# Patient Record
Sex: MALE | URBAN - METROPOLITAN AREA
[De-identification: names, ages, dates, MRNs, and addresses within clinical notes are randomized per-mention and may not be internally consistent; named-entity substitution may affect disease eponyms.]

---

## 2020-09-03 ENCOUNTER — IMPORTED ENCOUNTER (OUTPATIENT)
Dept: URBAN - METROPOLITAN AREA CLINIC 9 | Facility: CLINIC | Age: 65
End: 2020-09-03

## 2020-09-21 ENCOUNTER — IMPORTED ENCOUNTER (OUTPATIENT)
Dept: URBAN - METROPOLITAN AREA CLINIC 9 | Facility: CLINIC | Age: 65
End: 2020-09-21

## 2020-10-02 ENCOUNTER — IMPORTED ENCOUNTER (OUTPATIENT)
Dept: URBAN - METROPOLITAN AREA CLINIC 9 | Facility: CLINIC | Age: 65
End: 2020-10-02

## 2020-10-15 ENCOUNTER — IMPORTED ENCOUNTER (OUTPATIENT)
Dept: URBAN - METROPOLITAN AREA CLINIC 9 | Facility: CLINIC | Age: 65
End: 2020-10-15

## 2020-10-19 ENCOUNTER — IMPORTED ENCOUNTER (OUTPATIENT)
Dept: URBAN - METROPOLITAN AREA CLINIC 9 | Facility: CLINIC | Age: 65
End: 2020-10-19

## 2020-11-05 ENCOUNTER — IMPORTED ENCOUNTER (OUTPATIENT)
Dept: URBAN - METROPOLITAN AREA CLINIC 9 | Facility: CLINIC | Age: 65
End: 2020-11-05

## 2020-12-08 ENCOUNTER — IMPORTED ENCOUNTER (OUTPATIENT)
Dept: URBAN - METROPOLITAN AREA CLINIC 9 | Facility: CLINIC | Age: 65
End: 2020-12-08

## 2021-02-04 NOTE — PATIENT DISCUSSION
[phone]&nbsp;SN &nbsp; &nbsp; trm

## 2021-10-16 ASSESSMENT — KERATOMETRY
OD_AXISANGLE_DEGREES: 6
OD_K1POWER_DIOPTERS: 42
OD_AXISANGLE2_DEGREES: 91
OS_AXISANGLE_DEGREES: 178
OS_K2POWER_DIOPTERS: 7.875
OD_AXISANGLE2_DEGREES: 96
OD_K2POWER_DIOPTERS: 43
OS_K2POWER_DIOPTERS: 43.75
OS_K1POWER_DIOPTERS: 42.5
OD_AXISANGLE_DEGREES: 1
OS_AXISANGLE2_DEGREES: 90
OS_K1POWER_DIOPTERS: 8
OD_K2POWER_DIOPTERS: 7.875
OS_AXISANGLE2_DEGREES: 88
OS_AXISANGLE_DEGREES: 180
OD_K1POWER_DIOPTERS: 8

## 2021-10-16 ASSESSMENT — VISUAL ACUITY
OS_PH: 20/30 SN
OS_SC: 20/70 SN
OD_SC: 20/25 -2 SN
OS_CC: 20/50 SN
OS_PH: 20/60 SN
OD_PH: 20/30 SN
OS_PH: 20/40 SN
OS_SC: 20/100 SN
OD_CC: 20/20 SN
OD_SC: 20/40 - SN
OS_PH: 20/40 SN
OD_CC: 20/30 SN
OD_SC: 20/50 SN
OS_SC: 20/400 SN
OS_SC: 20/40 SN
OD_SC: 20/30 SN
OD_PH: 20/30 SN
OS_SC: 20/50 SN
OD_CC: 20/30 SN
OS_SC: 20/60 SN
OD_SC: 20/50 SN
OS_CC: 20/25 SN

## 2021-10-16 ASSESSMENT — TONOMETRY
OS_IOP_MMHG: 10
OD_IOP_MMHG: 15
OS_IOP_MMHG: 34
OD_IOP_MMHG: 11
OD_IOP_MMHG: 19
OD_IOP_MMHG: 19
OD_IOP_MMHG: 14
OD_IOP_MMHG: 29
OS_IOP_MMHG: 16
OS_IOP_MMHG: 13
OS_IOP_MMHG: 17
OS_IOP_MMHG: 20

## 2022-01-25 NOTE — PATIENT DISCUSSION
Discussed toric MF option and patient would like to try. CL trials ordered, patient understands may take 3-4 weeks for trials to arrive. Schedule 1-2wk f/u when trials dispensed. Patient requested CL Rx today fo DVO Rx, printed.

## 2022-12-27 NOTE — PATIENT DISCUSSION
Patient interested in [address] - order in trials of [facility] toric and can dispense without appt, patient can call to finalize if prefers daily vs current monthly replacement.